# Patient Record
Sex: FEMALE | Race: WHITE | NOT HISPANIC OR LATINO | Employment: FULL TIME | URBAN - METROPOLITAN AREA
[De-identification: names, ages, dates, MRNs, and addresses within clinical notes are randomized per-mention and may not be internally consistent; named-entity substitution may affect disease eponyms.]

---

## 2017-07-22 DIAGNOSIS — Z30.011 ENCOUNTER FOR INITIAL PRESCRIPTION OF CONTRACEPTIVE PILLS: ICD-10-CM

## 2017-07-23 RX ORDER — NORETHINDRONE 0.35 MG/1
TABLET ORAL
Qty: 28 TABLET | Refills: 0 | OUTPATIENT
Start: 2017-07-23

## 2017-08-22 ENCOUNTER — TELEPHONE (OUTPATIENT)
Dept: OBSTETRICS AND GYNECOLOGY | Facility: CLINIC | Age: 42
End: 2017-08-22

## 2017-08-22 NOTE — TELEPHONE ENCOUNTER
----- Message from Oneyda Monzon sent at 8/21/2017  3:06 PM CDT -----  Contact: pt  x_ 1st Request  _ 2nd Request  _ 3rd Request    Who: pt    Why: is calling in regards to a abnormal pap    What Number to Call Back: [phone]    When to Expect a call back: (Before the end of the day)  -- if call after 3:00 call back will be tomorrow.

## 2017-08-22 NOTE — TELEPHONE ENCOUNTER
----- Message from Cami Guillen sent at 8/22/2017  2:55 PM CDT -----  Contact: self  Pt returning a missed call, she can be reached at [phone].

## 2017-08-22 NOTE — TELEPHONE ENCOUNTER
Attempted to contact pt regarding her call to the clinic. No answer, left VM message for pt to call the clinic back.

## 2017-08-22 NOTE — TELEPHONE ENCOUNTER
----- Message from Jaclyn Motta sent at 8/22/2017 12:10 PM CDT -----  Contact: ADRIANNA STEPHENSON [[id number]]  x_  1st Request  _  2nd Request  _  3rd Request        Who: ADRIANNA STEPHENSON [[id number]]    Why: patient is returning a call    What Number to Call Back: [phone]    When to Expect a call back: (Before the end of the day)   -- if call after 3:00 call back will be tomorrow.

## 2017-08-22 NOTE — TELEPHONE ENCOUNTER
Contacted pt regarding her concerns about an abnormal pap. Pt informed me that she had a pap done with her family doctor and her results were abnormal so she wanted to f/u with Dr. Mills. Informed the pt the Dr. Mills is currently out of the country and that she doesn't have any available appointments for August or September and that her October schedule has not been released yet. Pt instructed to have her family doctor fax over her records to our office and provided the fax for this suite. Pt informed that I will make a reminder to give her a call when Dr. Mills arrives back in town to see how she wants to move forward with her care. Pt verbalized understanding.

## 2017-08-22 NOTE — TELEPHONE ENCOUNTER
Attempted to contact pt returning her call. No answer, left VM message for pt to call the clinic back.

## 2017-09-07 ENCOUNTER — TELEPHONE (OUTPATIENT)
Dept: OBSTETRICS AND GYNECOLOGY | Facility: CLINIC | Age: 42
End: 2017-09-07

## 2017-09-07 NOTE — TELEPHONE ENCOUNTER
Contacted pt about scheduling an appointment for her abnormal pap results she had at her family doctors office. Pt informed me that she has already scheduled an colposcopy procedure with another gynecologist. Pt instructed to contact the office if she has any questions or if she needs any further assistance feel free to contact the clinic. Pt verbalized understanding.

## 2017-09-07 NOTE — TELEPHONE ENCOUNTER
----- Message from Ivet Sanders MA sent at 8/22/2017  3:29 PM CDT -----  Ask Dr. Mills what type of visit for abnormal pap at [facility] doctor.

## 2018-02-06 ENCOUNTER — TELEPHONE (OUTPATIENT)
Dept: SMOKING CESSATION | Facility: CLINIC | Age: 43
End: 2018-02-06

## 2018-02-06 NOTE — TELEPHONE ENCOUNTER
Called patient to offer services through the [facility]. Spoke with patient and she is interested in program. Gave her number to call [phone]. She would like to attend in [place]. Informed her we have a clinic there. She states she will call for an appointment.

## 2021-01-29 ENCOUNTER — HOSPITAL ENCOUNTER (EMERGENCY)
Facility: HOSPITAL | Age: 46
Discharge: HOME | End: 2021-01-29
Attending: EMERGENCY MEDICINE
Payer: COMMERCIAL

## 2021-01-29 VITALS
SYSTOLIC BLOOD PRESSURE: 110 MMHG | WEIGHT: 152 LBS | OXYGEN SATURATION: 100 % | HEART RATE: 82 BPM | DIASTOLIC BLOOD PRESSURE: 68 MMHG | HEIGHT: 66 IN | BODY MASS INDEX: 24.43 KG/M2 | RESPIRATION RATE: 16 BRPM | TEMPERATURE: 98 F

## 2021-01-29 DIAGNOSIS — R10.10 PAIN OF UPPER ABDOMEN: Primary | ICD-10-CM

## 2021-01-29 LAB
ALBUMIN SERPL BCP-MCNC: 4.1 G/DL (ref 3.5–5.2)
ALP SERPL-CCNC: 39 U/L (ref 55–135)
ALT SERPL W/O P-5'-P-CCNC: 11 U/L (ref 10–44)
ANION GAP SERPL CALC-SCNC: 8 MMOL/L (ref 8–16)
AST SERPL-CCNC: 11 U/L (ref 10–40)
B-HCG UR QL: NEGATIVE
BASOPHILS # BLD AUTO: 0.08 K/UL (ref 0–0.2)
BASOPHILS NFR BLD: 0.7 % (ref 0–1.9)
BILIRUB SERPL-MCNC: 0.9 MG/DL (ref 0.1–1)
BILIRUB UR QL STRIP: NEGATIVE
BUN SERPL-MCNC: 7 MG/DL (ref 6–20)
CALCIUM SERPL-MCNC: 8.8 MG/DL (ref 8.7–10.5)
CHLORIDE SERPL-SCNC: 109 MMOL/L (ref 95–110)
CLARITY UR: CLEAR
CO2 SERPL-SCNC: 22 MMOL/L (ref 23–29)
COLOR UR: YELLOW
CREAT SERPL-MCNC: 0.8 MG/DL (ref 0.5–1.4)
CTP QC/QA: YES
DIFFERENTIAL METHOD: ABNORMAL
EOSINOPHIL # BLD AUTO: 0.4 K/UL (ref 0–0.5)
EOSINOPHIL NFR BLD: 3.4 % (ref 0–8)
ERYTHROCYTE [DISTWIDTH] IN BLOOD BY AUTOMATED COUNT: 12.2 % (ref 11.5–14.5)
EST. GFR  (AFRICAN AMERICAN): >60 ML/MIN/1.73 M^2
EST. GFR  (NON AFRICAN AMERICAN): >60 ML/MIN/1.73 M^2
GLUCOSE SERPL-MCNC: 92 MG/DL (ref 70–110)
GLUCOSE UR QL STRIP: NEGATIVE
HCT VFR BLD AUTO: 37.7 % (ref 37–48.5)
HGB BLD-MCNC: 12.4 G/DL (ref 12–16)
HGB UR QL STRIP: NEGATIVE
IMM GRANULOCYTES # BLD AUTO: 0.04 K/UL (ref 0–0.04)
IMM GRANULOCYTES NFR BLD AUTO: 0.3 % (ref 0–0.5)
KETONES UR QL STRIP: NEGATIVE
LEUKOCYTE ESTERASE UR QL STRIP: NEGATIVE
LIPASE SERPL-CCNC: 30 U/L (ref 4–60)
LYMPHOCYTES # BLD AUTO: 4.1 K/UL (ref 1–4.8)
LYMPHOCYTES NFR BLD: 34.4 % (ref 18–48)
MCH RBC QN AUTO: 29.4 PG (ref 27–31)
MCHC RBC AUTO-ENTMCNC: 32.9 G/DL (ref 32–36)
MCV RBC AUTO: 89 FL (ref 82–98)
MONOCYTES # BLD AUTO: 0.7 K/UL (ref 0.3–1)
MONOCYTES NFR BLD: 5.6 % (ref 4–15)
NEUTROPHILS # BLD AUTO: 6.6 K/UL (ref 1.8–7.7)
NEUTROPHILS NFR BLD: 55.6 % (ref 38–73)
NITRITE UR QL STRIP: NEGATIVE
NRBC BLD-RTO: 0 /100 WBC
PH UR STRIP: 6 [PH] (ref 5–8)
PLATELET # BLD AUTO: 328 K/UL (ref 150–350)
PMV BLD AUTO: 9.1 FL (ref 9.2–12.9)
POTASSIUM SERPL-SCNC: 3.2 MMOL/L (ref 3.5–5.1)
PROT SERPL-MCNC: 6.9 G/DL (ref 6–8.4)
PROT UR QL STRIP: NEGATIVE
RBC # BLD AUTO: 4.22 M/UL (ref 4–5.4)
SARS-COV-2 RDRP RESP QL NAA+PROBE: NEGATIVE
SODIUM SERPL-SCNC: 139 MMOL/L (ref 136–145)
SP GR UR STRIP: 1.01 (ref 1–1.03)
URN SPEC COLLECT METH UR: NORMAL
UROBILINOGEN UR STRIP-ACNC: NEGATIVE EU/DL
WBC # BLD AUTO: 11.8 K/UL (ref 3.9–12.7)

## 2021-01-29 PROCEDURE — 81003 URINALYSIS AUTO W/O SCOPE: CPT

## 2021-01-29 PROCEDURE — 80053 COMPREHEN METABOLIC PANEL: CPT

## 2021-01-29 PROCEDURE — 36415 COLL VENOUS BLD VENIPUNCTURE: CPT

## 2021-01-29 PROCEDURE — 85025 COMPLETE CBC W/AUTO DIFF WBC: CPT

## 2021-01-29 PROCEDURE — U0002 COVID-19 LAB TEST NON-CDC: HCPCS

## 2021-01-29 PROCEDURE — 25500020 PHARM REV CODE 255: Performed by: EMERGENCY MEDICINE

## 2021-01-29 PROCEDURE — 99285 EMERGENCY DEPT VISIT HI MDM: CPT | Mod: 25

## 2021-01-29 PROCEDURE — 81025 URINE PREGNANCY TEST: CPT | Performed by: EMERGENCY MEDICINE

## 2021-01-29 PROCEDURE — 83690 ASSAY OF LIPASE: CPT

## 2021-01-29 RX ORDER — TOPIRAMATE 100 MG/1
100 CAPSULE, EXTENDED RELEASE ORAL
COMMUNITY

## 2021-01-29 RX ADMIN — IOHEXOL 100 ML: 350 INJECTION, SOLUTION INTRAVENOUS at 04:01

## 2021-02-11 ENCOUNTER — OFFICE VISIT (OUTPATIENT)
Dept: ALLERGY | Facility: CLINIC | Age: 46
End: 2021-02-11
Payer: COMMERCIAL

## 2021-02-11 VITALS
HEIGHT: 66 IN | TEMPERATURE: 97 F | HEART RATE: 83 BPM | DIASTOLIC BLOOD PRESSURE: 70 MMHG | WEIGHT: 150 LBS | SYSTOLIC BLOOD PRESSURE: 108 MMHG | BODY MASS INDEX: 24.11 KG/M2 | OXYGEN SATURATION: 99 %

## 2021-02-11 DIAGNOSIS — K52.9 CHRONIC DIARRHEA: Primary | ICD-10-CM

## 2021-02-11 DIAGNOSIS — K90.49 FOOD INTOLERANCE: ICD-10-CM

## 2021-02-11 PROCEDURE — 99203 OFFICE O/P NEW LOW 30 MIN: CPT | Mod: S$GLB,,, | Performed by: ALLERGY & IMMUNOLOGY

## 2021-02-11 PROCEDURE — 3008F BODY MASS INDEX DOCD: CPT | Mod: S$GLB,,, | Performed by: ALLERGY & IMMUNOLOGY

## 2021-02-11 PROCEDURE — 99203 PR OFFICE/OUTPT VISIT, NEW, LEVL III, 30-44 MIN: ICD-10-PCS | Mod: S$GLB,,, | Performed by: ALLERGY & IMMUNOLOGY

## 2021-02-11 PROCEDURE — 3008F PR BODY MASS INDEX (BMI) DOCUMENTED: ICD-10-PCS | Mod: S$GLB,,, | Performed by: ALLERGY & IMMUNOLOGY

## 2021-02-11 RX ORDER — ACETAMINOPHEN AND CODEINE PHOSPHATE 120; 12 MG/5ML; MG/5ML
SOLUTION ORAL
COMMUNITY
End: 2023-11-15 | Stop reason: ALTCHOICE

## 2021-02-22 ENCOUNTER — PATIENT MESSAGE (OUTPATIENT)
Dept: ALLERGY | Facility: CLINIC | Age: 46
End: 2021-02-22

## 2021-03-02 ENCOUNTER — PROCEDURE VISIT (OUTPATIENT)
Dept: ALLERGY | Facility: CLINIC | Age: 46
End: 2021-03-02
Payer: COMMERCIAL

## 2021-03-02 VITALS — WEIGHT: 150 LBS | BODY MASS INDEX: 24.11 KG/M2 | HEIGHT: 66 IN

## 2021-03-02 DIAGNOSIS — K90.49 FOOD INTOLERANCE: ICD-10-CM

## 2021-03-02 DIAGNOSIS — K52.9 CHRONIC DIARRHEA: ICD-10-CM

## 2021-03-02 PROCEDURE — 95004 PR ALLERGY SKIN TESTS,ALLERGENS: ICD-10-PCS | Mod: S$GLB,,, | Performed by: ALLERGY & IMMUNOLOGY

## 2021-03-02 PROCEDURE — 95004 PERQ TESTS W/ALRGNC XTRCS: CPT | Mod: S$GLB,,, | Performed by: ALLERGY & IMMUNOLOGY

## 2021-05-10 ENCOUNTER — PATIENT MESSAGE (OUTPATIENT)
Dept: RESEARCH | Facility: HOSPITAL | Age: 46
End: 2021-05-10

## 2021-07-28 DIAGNOSIS — K52.9 NONINFECTIOUS GASTROENTERITIS AND COLITIS: Primary | ICD-10-CM

## 2021-08-11 DIAGNOSIS — K52.9 NONINFECTIOUS GASTROENTERITIS AND COLITIS: Primary | ICD-10-CM

## 2021-08-25 ENCOUNTER — HOSPITAL ENCOUNTER (OUTPATIENT)
Dept: RADIOLOGY | Facility: HOSPITAL | Age: 46
Discharge: HOME | End: 2021-08-25
Attending: INTERNAL MEDICINE
Payer: COMMERCIAL

## 2021-08-25 DIAGNOSIS — K80.20 CALCULUS OF GALLBLADDER WITHOUT CHOLECYSTITIS: ICD-10-CM

## 2021-08-25 DIAGNOSIS — K80.20 CALCULUS OF GALLBLADDER WITHOUT CHOLECYSTITIS: Primary | ICD-10-CM

## 2021-08-25 PROCEDURE — 76705 ECHO EXAM OF ABDOMEN: CPT | Mod: TC

## 2022-03-05 ENCOUNTER — HOSPITAL ENCOUNTER (EMERGENCY)
Facility: HOSPITAL | Age: 47
Discharge: HOME | End: 2022-03-05
Attending: EMERGENCY MEDICINE
Payer: COMMERCIAL

## 2022-03-05 VITALS
BODY MASS INDEX: 24.33 KG/M2 | WEIGHT: 155 LBS | TEMPERATURE: 99 F | OXYGEN SATURATION: 98 % | DIASTOLIC BLOOD PRESSURE: 74 MMHG | HEIGHT: 67 IN | SYSTOLIC BLOOD PRESSURE: 140 MMHG | HEART RATE: 87 BPM | RESPIRATION RATE: 18 BRPM
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
## 2022-03-05 DIAGNOSIS — S52.515A CLOSED NONDISPLACED FRACTURE OF STYLOID PROCESS OF LEFT RADIUS, INITIAL ENCOUNTER: Primary | ICD-10-CM

## 2022-03-05 PROCEDURE — 29105 APPLICATION LONG ARM SPLINT: CPT | Mod: LT

## 2022-03-05 PROCEDURE — 29125 APPL SHORT ARM SPLINT STATIC: CPT

## 2022-03-05 PROCEDURE — 99283 EMERGENCY DEPT VISIT LOW MDM: CPT | Mod: 25

## 2022-03-05 NOTE — DISCHARGE INSTRUCTIONS
Keep splint clean, dry and intact.  Follow up closely with orthopedics.   Take tylenol or motrin as needed.  For worsening symptoms, chest pain, shortness of breath, increased abdominal pain, high grade fever, stroke or stroke like symptoms, immediately go to the nearest Emergency Room or call 911 as soon as possible.

## 2022-03-05 NOTE — ED PROVIDER NOTES
Encounter Date: 3/5/2022       History     Chief Complaint   Patient presents with    Wrist Injury     Left  / roller skating fall last night      Patient is a 47 year old female who presents with left wrist pain for one day. She has PMH significant for migraine headache. She was roller skating last night when she fell and attempted to catch herself with both hands behind her. She reports pain, swelling and bruising to the left wrist. Pain radiates up the arm. She took motrin last night but no medications today. Denied previous injury or fracture.         Review of patient's allergies indicates:  No Known Allergies  Past Medical History:   Diagnosis Date    Abnormal Pap smear     Abnormal Pap smear of vagina     Herpes simplex without mention of complication     Migraine headache      Past Surgical History:   Procedure Laterality Date    CERVICAL BIOPSY  W/ LOOP ELECTRODE EXCISION  , ,     [place] 2/3     SECTION      COLPOSCOPY       Family History   Problem Relation Age of Onset    Breast cancer Maternal Grandmother     Diabetes Father     Diabetes Mother     Breast cancer Paternal Aunt     Ovarian cancer Neg Hx     Colon cancer Neg Hx      Social History     Tobacco Use    Smoking status: Current Every Day Smoker     Packs/day: 1.00    Smokeless tobacco: Never Used   Substance Use Topics    Alcohol use: Yes     Comment: socially    Drug use: No     Review of Systems   Constitutional: Negative for fever.   Respiratory: Negative for shortness of breath.    Genitourinary: Negative for flank pain.   Musculoskeletal: Positive for arthralgias and joint swelling. Negative for gait problem.   Skin: Positive for color change.   Neurological: Negative for weakness.   Psychiatric/Behavioral: Negative for confusion.       Physical Exam     Initial Vitals [22 1025]   BP Pulse Resp Temp SpO2   123/77 84 18 97.9 °F (36.6 °C) 99 %      MAP       --         Physical Exam    Nursing note and  vitals reviewed.  Constitutional: She appears well-developed and well-nourished. She is not diaphoretic. No distress.   HENT:   Head: Normocephalic and atraumatic.   Cardiovascular: Intact distal pulses.   Musculoskeletal:         General: Tenderness present. Normal range of motion.      Left wrist: Swelling, tenderness and bony tenderness present. No snuff box tenderness.      Comments: Tenderness, swelling and ecchymosis noted to left wrist. 5/5  strength. 2+ radial pulse. Sensation intact. No snuff box tenderness. No bony tenderness to elbow.      Neurological: She is alert. She has normal strength. No sensory deficit.   Skin: Skin is warm and dry. No rash and no abscess noted. No erythema.   Psychiatric: She has a normal mood and affect.         ED Course   Splint Application    Date/Time: 3/5/2022 4:21 PM  Performed by: Brandon DIXON RN  Authorized by: Agnela Rose PA-C   Location details: left wrist  Splint type: sugar tong  Supplies used: Ortho-Glass and cotton padding  Post-procedure: The splinted body part was neurovascularly unchanged following the procedure.  Patient tolerance: Patient tolerated the procedure well with no immediate complications        Labs Reviewed - No data to display       Imaging Results          X-Ray Wrist Complete Left (Final result)  Result time 03/05/22 10:45:09    Final result by Luisito Mon DO (03/05/22 10:45:09)                 Impression:      As above      Electronically signed by: Luisito Mon DO  Date:    03/05/2022  Time:    10:45             Narrative:    EXAMINATION:  XR WRIST COMPLETE 3 VIEWS LEFT    CLINICAL HISTORY:  Unspecified fall, initial encounter    TECHNIQUE:  PA, lateral, and oblique views of the left wrist were performed.    COMPARISON:  None    FINDINGS:  There is an acute obliquely oriented essentially nondisplaced fracture through the radial styloid with intra-articular extension.  Soft tissue swelling is noted.  Carpal  alignment is within normal limits.                                 Medications - No data to display  Medical Decision Making:   History:   Old Medical Records: I decided to obtain old medical records.  Clinical Tests:   Radiological Study: Ordered and Reviewed       APC / Resident Notes:   Urgent evaluation of a 47-year-old female who presents for left wrist pain after a fall while roller-skating.  She is neurovascular intact.  No erythema or warmth concerning for infectious process.  No snuffbox tenderness.  She has swelling, ecchymosis and tenderness.  X-ray consistent with left radial styloid fracture.  She was placed in a sugar-tong splint instructed follow-up closely with orthopedic. Discussed results with patient. Return precautions given. Based on my clinical evaluation, I do not appreciate any immediate, emergent, or life threatening condition or etiology that warrants additional workup today and feel that the patient can be discharged with close follow up care.  Patient is to follow up with their primary care provider.. All questions answered.                    Clinical Impression:   Final diagnoses:  [W19.XXXA] Fall  [S52.515A] Closed nondisplaced fracture of styloid process of left radius, initial encounter (Primary)          ED Disposition Condition    Discharge Stable        ED Prescriptions     None        Follow-up Information     Follow up With Specialties Details Why Contact Info    Harshal Holloway II, MD Orthopedic Surgery   [address] [place] [address] [address] [place] [address]  [phone]      Hiram Morillo MD Orthopedic Surgery   [address] [place] [address]  [address]  [facility]TH[facility]  [place] [address]  [phone]      [facility] Emergency Dept Emergency Medicine  As needed [address]  [facility] [id number] [phone]           Angela Rose PA-C  03/05/22 [id number]

## 2022-03-07 ENCOUNTER — TELEPHONE (OUTPATIENT)
Dept: ORTHOPEDICS | Facility: CLINIC | Age: 47
End: 2022-03-07
Payer: COMMERCIAL

## 2023-07-13 ENCOUNTER — HOSPITAL ENCOUNTER (EMERGENCY)
Facility: HOSPITAL | Age: 48
Discharge: LEFT BEFORE DISCHARGE | End: 2023-07-13
Payer: COMMERCIAL

## 2023-07-13 VITALS
DIASTOLIC BLOOD PRESSURE: 69 MMHG | SYSTOLIC BLOOD PRESSURE: 139 MMHG | RESPIRATION RATE: 18 BRPM | HEART RATE: 77 BPM | BODY MASS INDEX: 23.07 KG/M2 | OXYGEN SATURATION: 98 % | WEIGHT: 147 LBS | HEIGHT: 67 IN

## 2023-07-13 PROCEDURE — 99999 HC NO LEVEL OF SERVICE - ED ONLY: CPT

## 2023-10-11 ENCOUNTER — PATIENT MESSAGE (OUTPATIENT)
Dept: FAMILY MEDICINE | Facility: CLINIC | Age: 48
End: 2023-10-11

## 2023-10-13 ENCOUNTER — OFFICE VISIT (OUTPATIENT)
Dept: GASTROENTEROLOGY | Facility: CLINIC | Age: 48
End: 2023-10-13
Payer: COMMERCIAL

## 2023-10-13 VITALS
HEIGHT: 67 IN | DIASTOLIC BLOOD PRESSURE: 81 MMHG | SYSTOLIC BLOOD PRESSURE: 126 MMHG | WEIGHT: 152.13 LBS | BODY MASS INDEX: 23.88 KG/M2 | HEART RATE: 74 BPM

## 2023-10-13 DIAGNOSIS — K52.9 CHRONIC DIARRHEA: Primary | ICD-10-CM

## 2023-10-13 DIAGNOSIS — K80.20 GALLSTONES: ICD-10-CM

## 2023-10-13 DIAGNOSIS — R10.9 ABDOMINAL CRAMPS: ICD-10-CM

## 2023-10-13 DIAGNOSIS — Z86.010 HISTORY OF COLON POLYPS: ICD-10-CM

## 2023-10-13 DIAGNOSIS — R15.2 FECAL URGENCY: ICD-10-CM

## 2023-10-13 DIAGNOSIS — R19.8 IRREGULAR BOWEL HABITS: ICD-10-CM

## 2023-10-13 PROCEDURE — 3008F BODY MASS INDEX DOCD: CPT | Mod: CPTII,S$GLB,,

## 2023-10-13 PROCEDURE — 1160F PR REVIEW ALL MEDS BY PRESCRIBER/CLIN PHARMACIST DOCUMENTED: ICD-10-PCS | Mod: CPTII,S$GLB,,

## 2023-10-13 PROCEDURE — 99999 PR PBB SHADOW E&M-EST. PATIENT-LVL III: ICD-10-PCS | Mod: PBBFAC,,,

## 2023-10-13 PROCEDURE — 1160F RVW MEDS BY RX/DR IN RCRD: CPT | Mod: CPTII,S$GLB,,

## 2023-10-13 PROCEDURE — 99204 OFFICE O/P NEW MOD 45 MIN: CPT | Mod: S$GLB,,,

## 2023-10-13 PROCEDURE — 3074F SYST BP LT 130 MM HG: CPT | Mod: CPTII,S$GLB,,

## 2023-10-13 PROCEDURE — 99204 PR OFFICE/OUTPT VISIT, NEW, LEVL IV, 45-59 MIN: ICD-10-PCS | Mod: S$GLB,,,

## 2023-10-13 PROCEDURE — 1159F PR MEDICATION LIST DOCUMENTED IN MEDICAL RECORD: ICD-10-PCS | Mod: CPTII,S$GLB,,

## 2023-10-13 PROCEDURE — 3074F PR MOST RECENT SYSTOLIC BLOOD PRESSURE < 130 MM HG: ICD-10-PCS | Mod: CPTII,S$GLB,,

## 2023-10-13 PROCEDURE — 3079F DIAST BP 80-89 MM HG: CPT | Mod: CPTII,S$GLB,,

## 2023-10-13 PROCEDURE — 3079F PR MOST RECENT DIASTOLIC BLOOD PRESSURE 80-89 MM HG: ICD-10-PCS | Mod: CPTII,S$GLB,,

## 2023-10-13 PROCEDURE — 1159F MED LIST DOCD IN RCRD: CPT | Mod: CPTII,S$GLB,,

## 2023-10-13 PROCEDURE — 3008F PR BODY MASS INDEX (BMI) DOCUMENTED: ICD-10-PCS | Mod: CPTII,S$GLB,,

## 2023-10-13 PROCEDURE — 99999 PR PBB SHADOW E&M-EST. PATIENT-LVL III: CPT | Mod: PBBFAC,,,

## 2023-10-13 RX ORDER — DICYCLOMINE HYDROCHLORIDE 20 MG/1
20 TABLET ORAL EVERY 6 HOURS
Qty: 120 TABLET | Refills: 0 | Status: SHIPPED | OUTPATIENT
Start: 2023-10-13 | End: 2023-11-12

## 2023-10-13 NOTE — PROGRESS NOTES
Subjective:       Patient ID: Lashell Murcia is a 48 y.o. female Body mass index is 23.82 kg/m².    Chief Complaint: Diarrhea    This patient is new to me.  Referring Provider: Aaareferral Self for diarrhea.  Established patient of Dr. العراقي GI.     GI Problem  The primary symptoms include abdominal pain (experiences lower abdominal pain described as abdominal cramps occur occasionally prior to having a BM or with eating gluten, greasy, and salads) and diarrhea (has experienced chronic diarrhea for almost 5 years now, saw Dr. العراقي for diarrhea states had a colonoscopy in 2021 biopsies negative, does not recall what all stool test was done or results, pt concerned w/ chronic diarrhea and experiences fecal urgency). Primary symptoms do not include fever, weight loss, fatigue, nausea, vomiting, melena, hematemesis, jaundice, hematochezia, myalgias or arthralgias.   The illness does not include chills, dysphagia, bloating or constipation. Associated symptoms comments: Pt states has hx of gallstones and was going to see general surgery but has not yet. Patients was seeing Dr. اعلراقي for GI symptoms, pt states has hx of colon polyps, denies personal or family hx of colon cancer  -pt states prior to diarrhea she states her stools was never formed it states it appeared as peanut butter consistency . Associated medical issues do not include inflammatory bowel disease, GERD, gallstones, liver disease, alcohol abuse, PUD, bowel resection, irritable bowel syndrome or diverticulitis.   Diarrhea   This is a chronic (for the past 4 years) problem. Episode frequency: up to 6 BMs per day. The problem has been unchanged. Diarrhea characteristics: rates stool type 6-7 on bristol stool scale, no blood no melana, appears as dark green. The patient states that diarrhea does not awaken her from sleep. Associated symptoms include abdominal pain (experiences lower abdominal pain described as abdominal cramps occur occasionally prior to  having a BM or with eating gluten, greasy, and salads). Pertinent negatives include no arthralgias, bloating, chills, coughing, fever, headaches, increased  flatus, myalgias, sweats, URI, vomiting or weight loss. Associated symptoms comments: Pt states diarrhea started after trying . The symptoms are aggravated by rye/wheat and stress (states tries to stay away from gluten as has noticed it makes diarrhea worse, states she is very anxious all the time and could be making diarrhea worse will f/u with PCP for anxiety management). She has tried anti-motility drug (has tried imodium prn, probiotic, ibgard) for the symptoms. The treatment provided mild relief. There is no history of bowel resection, inflammatory bowel disease, irritable bowel syndrome, malabsorption, a recent abdominal surgery or short gut syndrome.       Review of Systems   Constitutional:  Negative for activity change, appetite change, chills, fatigue, fever, unexpected weight change and weight loss.   Respiratory:  Negative for cough.    Gastrointestinal:  Positive for abdominal pain (experiences lower abdominal pain described as abdominal cramps occur occasionally prior to having a BM or with eating gluten, greasy, and salads) and diarrhea (has experienced chronic diarrhea for almost 5 years now, saw Dr. العراقي for diarrhea states had a colonoscopy in 2021 biopsies negative, does not recall what all stool test was done or results, pt concerned w/ chronic diarrhea and experiences fecal urgency). Negative for abdominal distention, anal bleeding, bloating, blood in stool, constipation, dysphagia, flatus, hematemesis, hematochezia, jaundice, melena, nausea, rectal pain and vomiting.   Musculoskeletal:  Negative for arthralgias and myalgias.   Neurological:  Negative for headaches.         No LMP recorded.  Past Medical History:   Diagnosis Date    Abnormal Pap smear     Abnormal Pap smear of vagina     Chronic diarrhea     Colon polyp     Herpes simplex  without mention of complication     Migraine headache      Past Surgical History:   Procedure Laterality Date    CERVICAL BIOPSY  W/ LOOP ELECTRODE EXCISION  , ,     [place] 2/3     SECTION      COLONOSCOPY          COLPOSCOPY       Family History   Problem Relation Age of Onset    Diabetes Mother     Diabetes Father     Breast cancer Paternal Aunt     Breast cancer Maternal Grandmother     Ovarian cancer Neg Hx     Colon cancer Neg Hx     Crohn's disease Neg Hx     Esophageal cancer Neg Hx     Liver cancer Neg Hx     Rectal cancer Neg Hx     Ulcerative colitis Neg Hx     Stomach cancer Neg Hx      Social History     Tobacco Use    Smoking status: Every Day     Current packs/day: 1.00     Types: Cigarettes    Smokeless tobacco: Never   Substance Use Topics    Alcohol use: Yes     Comment: socially    Drug use: No     Wt Readings from Last 10 Encounters:   10/13/23 69 kg (152 lb 1.9 oz)   23 66.7 kg (147 lb)   22 70.3 kg (155 lb)   21 68 kg (150 lb)   21 68 kg (150 lb)   21 68.9 kg (152 lb)   11/11/15 81.8 kg (180 lb 5.4 oz)   09/28/15 94.8 kg (209 lb)   09/21/15 94 kg (207 lb 4.8 oz)   09/17/15 94 kg (207 lb 4.8 oz)     Lab Results   Component Value Date    WBC 11.80 2021    HGB 12.4 2021    HCT 37.7 2021    MCV 89 2021     2021     CMP  Sodium   Date Value Ref Range Status   2021 139 136 - 145 mmol/L Final     Potassium   Date Value Ref Range Status   2021 3.2 (L) 3.5 - 5.1 mmol/L Final     Chloride   Date Value Ref Range Status   2021 109 95 - 110 mmol/L Final     CO2   Date Value Ref Range Status   2021 22 (L) 23 - 29 mmol/L Final     Glucose   Date Value Ref Range Status   2021 92 70 - 110 mg/dL Final     BUN   Date Value Ref Range Status   2021 7 6 - 20 mg/dL Final     Creatinine   Date Value Ref Range Status   2021 0.8 0.5 - 1.4 mg/dL Final     Calcium   Date Value Ref Range Status  "  01/29/2021 8.8 8.7 - 10.5 mg/dL Final     Total Protein   Date Value Ref Range Status   01/29/2021 6.9 6.0 - 8.4 g/dL Final     Albumin   Date Value Ref Range Status   01/29/2021 4.1 3.5 - 5.2 g/dL Final     Total Bilirubin   Date Value Ref Range Status   01/29/2021 0.9 0.1 - 1.0 mg/dL Final     Comment:     For infants and newborns, interpretation of results should be based  on gestational age, weight and in agreement with clinical  observations.    Premature Infant recommended reference ranges:  Up to 24 hours.............<8.0 mg/dL  Up to 48 hours............<12.0 mg/dL  3-5 days..................<15.0 mg/dL  6-29 days.................<15.0 mg/dL       Alkaline Phosphatase   Date Value Ref Range Status   01/29/2021 39 (L) 55 - 135 U/L Final     AST   Date Value Ref Range Status   01/29/2021 11 10 - 40 U/L Final     ALT   Date Value Ref Range Status   01/29/2021 11 10 - 44 U/L Final     Anion Gap   Date Value Ref Range Status   01/29/2021 8 8 - 16 mmol/L Final     eGFR if    Date Value Ref Range Status   01/29/2021 >60.0 >60 mL/min/1.73 m^2 Final     eGFR if non    Date Value Ref Range Status   01/29/2021 >60.0 >60 mL/min/1.73 m^2 Final     Comment:     Calculation used to obtain the estimated glomerular filtration  rate (eGFR) is the CKD-EPI equation.        Lab Results   Component Value Date    LIPASE 30 01/29/2021     No results found for: "LIPASERES"  Lab Results   Component Value Date    TSH 1.050 01/27/2012       Reviewed prior medical records including radiology report of ultrasound abdomen 08/25/2021, CT abdomen pelvis 01/29/2021 & endoscopy history (see surgical history/procedures).    Objective:      Physical Exam  Vitals and nursing note reviewed.   Constitutional:       Appearance: Normal appearance. She is normal weight.   Cardiovascular:      Rate and Rhythm: Normal rate and regular rhythm.      Heart sounds: Normal heart sounds.   Pulmonary:      Breath sounds: " Normal breath sounds.   Abdominal:      General: Bowel sounds are normal.      Palpations: Abdomen is soft.      Tenderness: There is no abdominal tenderness.   Skin:     General: Skin is warm and dry.      Coloration: Skin is not jaundiced.   Neurological:      Mental Status: She is alert and oriented to person, place, and time.   Psychiatric:         Mood and Affect: Mood normal.         Behavior: Behavior normal.         Assessment:       1. Chronic diarrhea    2. Abdominal cramps    3. Gallstones    4. Irregular bowel habits        Plan:       Chronic diarrhea  -     CBC Without Differential; Future; Expected date: 10/13/2023  -     Comprehensive Metabolic Panel; Future; Expected date: 10/13/2023  -     H. pylori antigen, stool; Future; Expected date: 10/13/2023  -     Pancreatic elastase, fecal; Future; Expected date: 10/13/2023  -     Giardia / Cryptosporidum, EIA; Future; Expected date: 10/13/2023  -     Stool Exam-Ova,Cysts,Parasites; Future; Expected date: 10/13/2023  -     Clostridium difficile EIA; Future; Expected date: 10/13/2023  -     Stool culture; Future; Expected date: 10/13/2023  -     TISSUE TRANSGLUTAMINASE (TTG), IGA; Future; Expected date: 10/13/2023  -     IGA; Future; Expected date: 10/13/2023  -     TSH; Future; Expected date: 10/13/2023  -  START   dicyclomine (BENTYL) 20 mg tablet; Take 1 tablet (20 mg total) by mouth every 6 (six) hours.  Dispense: 120 tablet; Refill: 0  -     Case Request Endoscopy: COLONOSCOPY  - [employer] Diet discussed with patient handout provided    Abdominal cramps   -  START   dicyclomine (BENTYL) 20 mg tablet; Take 1 tablet (20 mg total) by mouth every 6 (six) hours.  Dispense: 120 tablet; Refill: 0     Irregular bowel habits  -     Case Request Endoscopy: COLONOSCOPY  - schedule Colonoscopy, discussed procedure with the patient, including risks and benefits, patient verbalized understanding    Fecal urgency   - schedule Colonoscopy, discussed procedure with the  patient, including risks and benefits, patient verbalized understanding   Recommend high fiber diet (20-30 grams of fiber daily)/OTC fiber supplements daily as directed, such as Benefiber or Metamucil.    History of colon polyps   - schedule Colonoscopy, discussed procedure with the patient, including risks and benefits, patient verbalized understanding    Gallstones   -Recommended seeing general surgeon      Follow up in about 4 weeks (around 11/10/2023), or if symptoms worsen or fail to improve.      If no improvement in symptoms or symptoms worsen, call/follow-up at clinic or go to ER.       [facility] - GASTROENTEROLOGY  [facility], [place]     Dictation software program was used for this note. Please expect some simple typographical  errors in this note.    Encounter includes face to face time and non-face to face time preparing to see the patient (eg, review of tests), obtaining and/or reviewing separately obtained history, documenting clinical information in the electronic or other health record, independently interpreting results (not separately reported) and communicating results to the patient/family/caregiver, or care coordination (not separately reported).

## 2023-11-15 ENCOUNTER — HOSPITAL ENCOUNTER (EMERGENCY)
Facility: HOSPITAL | Age: 48
Discharge: HOME | End: 2023-11-15
Attending: EMERGENCY MEDICINE
Payer: COMMERCIAL

## 2023-11-15 VITALS
OXYGEN SATURATION: 97 % | HEART RATE: 78 BPM | HEIGHT: 67 IN | TEMPERATURE: 98 F | SYSTOLIC BLOOD PRESSURE: 123 MMHG | WEIGHT: 154 LBS | RESPIRATION RATE: 16 BRPM | DIASTOLIC BLOOD PRESSURE: 62 MMHG | BODY MASS INDEX: 24.17 KG/M2

## 2023-11-15 DIAGNOSIS — N83.209 RUPTURED OVARIAN CYST: ICD-10-CM

## 2023-11-15 DIAGNOSIS — K52.9 ENTEROCOLITIS: Primary | ICD-10-CM

## 2023-11-15 DIAGNOSIS — D21.9 FIBROIDS: ICD-10-CM

## 2023-11-15 DIAGNOSIS — K80.20 GALLSTONES: ICD-10-CM

## 2023-11-15 LAB
ALBUMIN SERPL BCP-MCNC: 4 G/DL (ref 3.5–5.2)
ALP SERPL-CCNC: 52 U/L (ref 55–135)
ALT SERPL W/O P-5'-P-CCNC: 18 U/L (ref 10–44)
ANION GAP SERPL CALC-SCNC: 7 MMOL/L (ref 8–16)
AST SERPL-CCNC: 15 U/L (ref 10–40)
BASOPHILS # BLD AUTO: 0.11 K/UL (ref 0–0.2)
BASOPHILS NFR BLD: 0.6 % (ref 0–1.9)
BILIRUB SERPL-MCNC: 0.4 MG/DL (ref 0.1–1)
BILIRUB UR QL STRIP: NEGATIVE
BUN SERPL-MCNC: 10 MG/DL (ref 6–20)
CALCIUM SERPL-MCNC: 8.6 MG/DL (ref 8.7–10.5)
CHLORIDE SERPL-SCNC: 108 MMOL/L (ref 95–110)
CLARITY UR: CLEAR
CO2 SERPL-SCNC: 25 MMOL/L (ref 23–29)
COLOR UR: YELLOW
CREAT SERPL-MCNC: 0.8 MG/DL (ref 0.5–1.4)
DIFFERENTIAL METHOD: ABNORMAL
EOSINOPHIL # BLD AUTO: 0.5 K/UL (ref 0–0.5)
EOSINOPHIL NFR BLD: 2.6 % (ref 0–8)
ERYTHROCYTE [DISTWIDTH] IN BLOOD BY AUTOMATED COUNT: 12.2 % (ref 11.5–14.5)
EST. GFR  (NO RACE VARIABLE): >60 ML/MIN/1.73 M^2
GLUCOSE SERPL-MCNC: 98 MG/DL (ref 70–110)
GLUCOSE UR QL STRIP: NEGATIVE
HCT VFR BLD AUTO: 39.6 % (ref 37–48.5)
HGB BLD-MCNC: 13 G/DL (ref 12–16)
HGB UR QL STRIP: NEGATIVE
IMM GRANULOCYTES # BLD AUTO: 0.06 K/UL (ref 0–0.04)
IMM GRANULOCYTES NFR BLD AUTO: 0.3 % (ref 0–0.5)
INFLUENZA A, MOLECULAR: NEGATIVE
INFLUENZA B, MOLECULAR: NEGATIVE
KETONES UR QL STRIP: NEGATIVE
LEUKOCYTE ESTERASE UR QL STRIP: NEGATIVE
LIPASE SERPL-CCNC: 34 U/L (ref 4–60)
LYMPHOCYTES # BLD AUTO: 4.3 K/UL (ref 1–4.8)
LYMPHOCYTES NFR BLD: 24.5 % (ref 18–48)
MCH RBC QN AUTO: 28.9 PG (ref 27–31)
MCHC RBC AUTO-ENTMCNC: 32.8 G/DL (ref 32–36)
MCV RBC AUTO: 88 FL (ref 82–98)
MONOCYTES # BLD AUTO: 1 K/UL (ref 0.3–1)
MONOCYTES NFR BLD: 5.5 % (ref 4–15)
NEUTROPHILS # BLD AUTO: 11.6 K/UL (ref 1.8–7.7)
NEUTROPHILS NFR BLD: 66.5 % (ref 38–73)
NITRITE UR QL STRIP: NEGATIVE
NRBC BLD-RTO: 0 /100 WBC
PH UR STRIP: 6 [PH] (ref 5–8)
PLATELET # BLD AUTO: 360 K/UL (ref 150–450)
PMV BLD AUTO: 8.9 FL (ref 9.2–12.9)
POTASSIUM SERPL-SCNC: 3.4 MMOL/L (ref 3.5–5.1)
PROT SERPL-MCNC: 6.4 G/DL (ref 6–8.4)
PROT UR QL STRIP: NEGATIVE
RBC # BLD AUTO: 4.5 M/UL (ref 4–5.4)
SARS-COV-2 RDRP RESP QL NAA+PROBE: NEGATIVE
SODIUM SERPL-SCNC: 140 MMOL/L (ref 136–145)
SP GR UR STRIP: 1.01 (ref 1–1.03)
SPECIMEN SOURCE: NORMAL
URN SPEC COLLECT METH UR: NORMAL
UROBILINOGEN UR STRIP-ACNC: NEGATIVE EU/DL
WBC # BLD AUTO: 17.49 K/UL (ref 3.9–12.7)

## 2023-11-15 PROCEDURE — 80053 COMPREHEN METABOLIC PANEL: CPT | Performed by: EMERGENCY MEDICINE

## 2023-11-15 PROCEDURE — 25500020 PHARM REV CODE 255

## 2023-11-15 PROCEDURE — 36415 COLL VENOUS BLD VENIPUNCTURE: CPT | Performed by: EMERGENCY MEDICINE

## 2023-11-15 PROCEDURE — 87502 INFLUENZA DNA AMP PROBE: CPT | Performed by: EMERGENCY MEDICINE

## 2023-11-15 PROCEDURE — U0002 COVID-19 LAB TEST NON-CDC: HCPCS | Performed by: EMERGENCY MEDICINE

## 2023-11-15 PROCEDURE — 81003 URINALYSIS AUTO W/O SCOPE: CPT | Performed by: EMERGENCY MEDICINE

## 2023-11-15 PROCEDURE — 83690 ASSAY OF LIPASE: CPT | Performed by: EMERGENCY MEDICINE

## 2023-11-15 PROCEDURE — 85025 COMPLETE CBC W/AUTO DIFF WBC: CPT | Performed by: EMERGENCY MEDICINE

## 2023-11-15 PROCEDURE — 99285 EMERGENCY DEPT VISIT HI MDM: CPT | Mod: 25

## 2023-11-15 PROCEDURE — 63600175 PHARM REV CODE 636 W HCPCS: Performed by: EMERGENCY MEDICINE

## 2023-11-15 PROCEDURE — 96374 THER/PROPH/DIAG INJ IV PUSH: CPT | Mod: 59

## 2023-11-15 RX ORDER — MORPHINE SULFATE 4 MG/ML
4 INJECTION, SOLUTION INTRAMUSCULAR; INTRAVENOUS
Status: COMPLETED | OUTPATIENT
Start: 2023-11-15 | End: 2023-11-15

## 2023-11-15 RX ORDER — METRONIDAZOLE 500 MG/1
500 TABLET ORAL 3 TIMES DAILY
Qty: 21 TABLET | Refills: 0 | Status: SHIPPED | OUTPATIENT
Start: 2023-11-15 | End: 2023-11-22

## 2023-11-15 RX ORDER — CIPROFLOXACIN 500 MG/1
500 TABLET ORAL 2 TIMES DAILY
Qty: 20 TABLET | Refills: 0 | Status: SHIPPED | OUTPATIENT
Start: 2023-11-15 | End: 2023-11-25

## 2023-11-15 RX ORDER — DICYCLOMINE HYDROCHLORIDE 20 MG/1
20 TABLET ORAL EVERY 6 HOURS
COMMUNITY
End: 2024-02-01 | Stop reason: SDUPTHER

## 2023-11-15 RX ORDER — SUMATRIPTAN SUCCINATE 100 MG/1
100 TABLET ORAL
COMMUNITY

## 2023-11-15 RX ADMIN — IOHEXOL 75 ML: 350 INJECTION, SOLUTION INTRAVENOUS at 07:11

## 2023-11-15 RX ADMIN — MORPHINE SULFATE 4 MG: 4 INJECTION, SOLUTION INTRAMUSCULAR; INTRAVENOUS at 08:11

## 2023-11-16 NOTE — ED PROVIDER NOTES
Encounter Date: 11/15/2023       History     Chief Complaint   Patient presents with    Abdominal Pain     Stated pain that starts periumbilical, radiates to right flank and down legs.    Took bentyl 20mg at noon- didn't help     48-year-old female past history chronic diarrhea and gallstones presents today with periumbilical abdominal pain that began today.  She reports she initially thought she just ate too much chocolate yesterday and that is why her stomach was cramping.  Patient reports diarrhea but states that is chronic and nonbloody.  She is been followed for that by GI in his recently prescribed dicyclomine which he tried taking today without any relief.  Denies any nausea vomiting.  Denies any fevers or chills.  Denies any chest pain or shortness of breath.  Denies any dysuria hematuria patient reports she is had a  but no other abdominal surgeries.  She does report she had an unremarkable colonoscopy in the past by Dr. العراقي but I am unable to see the reports.  Patient is followed by GI for chronic diarrhea and abdominal pain however this is different than her baseline.    The history is provided by the patient. No  was used.     Review of patient's allergies indicates:  No Known Allergies  Past Medical History:   Diagnosis Date    Abnormal Pap smear     Abnormal Pap smear of vagina     Chronic diarrhea     Colon polyp     Herpes simplex without mention of complication     Migraine headache      Past Surgical History:   Procedure Laterality Date    CERVICAL BIOPSY  W/ LOOP ELECTRODE EXCISION  , ,     [facility] 2/3     SECTION      COLONOSCOPY          COLPOSCOPY       Family History   Problem Relation Age of Onset    Diabetes Mother     Diabetes Father     Breast cancer Paternal Aunt     Breast cancer Maternal Grandmother     Ovarian cancer Neg Hx     Colon cancer Neg Hx     Crohn's disease Neg Hx     Esophageal cancer Neg Hx     Liver cancer Neg Hx      Rectal cancer Neg Hx     Ulcerative colitis Neg Hx     Stomach cancer Neg Hx      Social History     Tobacco Use    Smoking status: Every Day     Current packs/day: 1.00     Types: Cigarettes    Smokeless tobacco: Never   Substance Use Topics    Alcohol use: Yes     Comment: socially    Drug use: No     Review of Systems    Physical Exam     Initial Vitals [11/15/23 1621]   BP Pulse Resp Temp SpO2   122/67 70 (!) 22 98 °F (36.7 °C) 100 %      MAP       --         Physical Exam    Nursing note and vitals reviewed.  Constitutional: She appears well-developed. She is not diaphoretic. No distress.   HENT:   Head: Normocephalic and atraumatic.   Nose: Nose normal.   Eyes: EOM are normal. No scleral icterus.   Neck: Neck supple.   Normal range of motion.  Cardiovascular:  Normal rate, regular rhythm, normal heart sounds and intact distal pulses.     Exam reveals no gallop and no friction rub.       No murmur heard.  Pulmonary/Chest: Breath sounds normal. No stridor. No respiratory distress. She has no wheezes. She has no rhonchi. She has no rales.   Abdominal: Abdomen is soft. Bowel sounds are normal. She exhibits no distension. There is abdominal tenderness (Minimal abdominal tenderness worse in the periumbilical region). There is no rebound and no guarding.   Musculoskeletal:         General: Normal range of motion.      Cervical back: Normal range of motion and neck supple.     Neurological: She is alert and oriented to person, place, and time. She has normal strength. No cranial nerve deficit or sensory deficit. GCS score is 15. GCS eye subscore is 4. GCS verbal subscore is 5. GCS motor subscore is 6.   Skin: Skin is warm and dry. Capillary refill takes less than 2 seconds. No rash noted.   Psychiatric: She has a normal mood and affect.         ED Course   Procedures  Labs Reviewed   CBC W/ AUTO DIFFERENTIAL - Abnormal; Notable for the following components:       Result Value    WBC 17.49 (*)     MPV 8.9 (*)     Gran  # (ANC) 11.6 (*)     Immature Grans (Abs) 0.06 (*)     All other components within normal limits   COMPREHENSIVE METABOLIC PANEL - Abnormal; Notable for the following components:    Potassium 3.4 (*)     Calcium 8.6 (*)     Alkaline Phosphatase 52 (*)     Anion Gap 7 (*)     All other components within normal limits   INFLUENZA A & B BY MOLECULAR   LIPASE   URINALYSIS, REFLEX TO URINE CULTURE    Narrative:     Specimen Source->Urine   SARS-COV-2 RNA AMPLIFICATION, QUAL          Imaging Results              CT Abdomen Pelvis With IV Contrast (In process)                      Medications   iohexoL (OMNIPAQUE 350) 350 mg iodine/mL injection (75 mLs Intravenous Given 11/15/23 1910)   morphine injection 4 mg (4 mg Intravenous Given 11/15/23 2040)     Medical Decision Making  48-year-old female presenting with periumbilical and lower abdominal pain. CT confirms colitis, fibroids and ovarian cysts.  Do not suspect mesenteric ischemia.  I do not think emergent surgical intervention is indicated.  Pain is well controlled here.  Leukocytosis corresponding is consistent with inflammatory state.  There is no sign of abscess, perforation, or other acute complication.  Do not suspect C diff infection at this time given no recent antibiotics.  Joint decision making was made with patient and I will prescribe antibiotics for potential bacterial etiology. Discussed in detail black box warning and risks associated with fluoroquinolones.  Patient is aware of these risks and agrees to proceed with antibiotics.  All results discussed in detail with patient at bedside.  We will plan on close outpatient PCP, Gyn and GI follow-up for reassessment and I have review detailed return precautions.      Amount and/or Complexity of Data Reviewed  Labs: ordered. Decision-making details documented in ED Course.  Radiology: ordered.    Risk  Prescription drug management.               ED Course as of 11/15/23 2214   Wed Nov 15, 2023   1847 WBC(!):  17.49 [BD]   2045 IMPRESSION:  1. Bowel findings suspicious for mild enterocolitis and developing diarrheal state. No evidence of  obstruction or perforation.  2. Gallstones without evidence of cholecystitis or biliary obstruction.  3. There is a 2.4 cm partially collapsed appearing cyst with mild peripheral enhancement in the right  ovary most consistent with a recently ruptured and involuting ovarian follicle. This is within physiologic  range but may produce symptoms.  4. 2.5 cm uterine fibroid.  5. Question cervical fullness/thickening without focal mass, unchanged from 01/29/2021, probably  anatomic variant although consider direct clinical evaluation.  6. Small left and very small right fatty inguinal hernias with no bowel herniation or features of  strangulation.  7. Additional nonemergent findings detailed above.  8. Additional nonemergent findings detailed above.  Dictated and Authenticated by: Enzo Barcenas MD  11/15/2023 8:34 PM Central Time ([place] & [place]) [BD]      ED Course User Index  [BD] Edgardo Coates MD                    Clinical Impression:   Final diagnoses:  [K52.9] Enterocolitis (Primary)  [N83.209] Ruptured ovarian cyst  [K80.20] Gallstones  [D21.9] Fibroids        ED Disposition Condition    Discharge Stable          ED Prescriptions       Medication Sig Dispense Start Date End Date Auth. Provider    ciprofloxacin HCl (CIPRO) 500 MG tablet Take 1 tablet (500 mg total) by mouth 2 (two) times daily. for 10 days 20 tablet 11/15/2023 11/25/2023 Edgardo Coates MD    metroNIDAZOLE (FLAGYL) 500 MG tablet Take 1 tablet (500 mg total) by mouth 3 (three) times daily. for 7 days 21 tablet 11/15/2023 11/22/2023 Edgardo Coates MD          Follow-up Information       Follow up With Specialties Details Why Contact Info Additional Information    Your PCP  Go in 2 days       [facility] Emergency Medicine Go to  As needed, If symptoms worsen [address] [place] [address] [address]  [id number] 1st floor             Edgardo Coates MD  11/15/23 9325

## 2024-02-01 DIAGNOSIS — R10.9 ABDOMINAL CRAMPING: Primary | ICD-10-CM

## 2024-02-01 RX ORDER — DICYCLOMINE HYDROCHLORIDE 20 MG/1
20 TABLET ORAL EVERY 6 HOURS PRN
Qty: 240 TABLET | Refills: 0 | Status: SHIPPED | OUTPATIENT
Start: 2024-02-01 | End: 2024-04-02 | Stop reason: SDUPTHER

## 2024-02-01 NOTE — TELEPHONE ENCOUNTER
Received prescription refill request via [employer] fax for dicyclomine 20 mg tab, quantity 120 tabs.

## 2024-04-02 DIAGNOSIS — R10.9 ABDOMINAL CRAMPING: ICD-10-CM

## 2024-04-02 RX ORDER — DICYCLOMINE HYDROCHLORIDE 20 MG/1
20 TABLET ORAL EVERY 6 HOURS PRN
Qty: 240 TABLET | Refills: 0 | Status: SHIPPED | OUTPATIENT
Start: 2024-04-02 | End: 2024-05-31 | Stop reason: SDUPTHER

## 2024-05-31 DIAGNOSIS — R10.9 ABDOMINAL CRAMPING: ICD-10-CM

## 2024-05-31 RX ORDER — DICYCLOMINE HYDROCHLORIDE 20 MG/1
20 TABLET ORAL EVERY 6 HOURS PRN
Qty: 240 TABLET | Refills: 0 | Status: SHIPPED | OUTPATIENT
Start: 2024-05-31 | End: 2024-07-30

## 2024-05-31 NOTE — TELEPHONE ENCOUNTER
Received fax via [employer] [facility] [employer] Prescription refill request for Dicyclomine 20 mg tabs, quantity 240. Forwarding refill request on to prescriber, ARDEN Jensen for review.